# Patient Record
Sex: FEMALE | Race: BLACK OR AFRICAN AMERICAN | NOT HISPANIC OR LATINO | Employment: UNEMPLOYED | URBAN - METROPOLITAN AREA
[De-identification: names, ages, dates, MRNs, and addresses within clinical notes are randomized per-mention and may not be internally consistent; named-entity substitution may affect disease eponyms.]

---
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
## 2023-04-30 ENCOUNTER — HEALTH MAINTENANCE LETTER (OUTPATIENT)
Age: 18
End: 2023-04-30

## 2023-06-05 ENCOUNTER — OFFICE VISIT (OUTPATIENT)
Dept: OPTOMETRY | Facility: CLINIC | Age: 18
End: 2023-06-05
Payer: OTHER GOVERNMENT

## 2023-06-05 DIAGNOSIS — Z01.00 EXAMINATION OF EYES AND VISION: Primary | ICD-10-CM

## 2023-06-05 DIAGNOSIS — H52.13 MYOPIA OF BOTH EYES: ICD-10-CM

## 2023-06-05 DIAGNOSIS — H52.223 REGULAR ASTIGMATISM OF BOTH EYES: ICD-10-CM

## 2023-06-05 PROCEDURE — 92004 COMPRE OPH EXAM NEW PT 1/>: CPT | Performed by: OPTOMETRIST

## 2023-06-05 PROCEDURE — 92015 DETERMINE REFRACTIVE STATE: CPT | Performed by: OPTOMETRIST

## 2023-06-05 ASSESSMENT — REFRACTION_MANIFEST
OD_CYLINDER: +0.25
METHOD_AUTOREFRACTION: 1
OD_AXIS: 168
OD_SPHERE: -1.50
OS_SPHERE: -1.75
OS_CYLINDER: +0.25
OS_AXIS: 178

## 2023-06-05 ASSESSMENT — REFRACTION_WEARINGRX
OD_SPHERE: -1.25
OS_SPHERE: -1.75
OS_AXIS: 178
OD_AXIS: 168
SPECS_TYPE: SVL
OS_CYLINDER: +0.75
OD_CYLINDER: +0.25

## 2023-06-05 ASSESSMENT — CONF VISUAL FIELD
OS_INFERIOR_NASAL_RESTRICTION: 0
OS_SUPERIOR_TEMPORAL_RESTRICTION: 0
OS_INFERIOR_TEMPORAL_RESTRICTION: 0
OS_SUPERIOR_NASAL_RESTRICTION: 0
OD_SUPERIOR_TEMPORAL_RESTRICTION: 0
OD_SUPERIOR_NASAL_RESTRICTION: 0
OS_NORMAL: 1
OD_NORMAL: 1
OD_INFERIOR_TEMPORAL_RESTRICTION: 0
OD_INFERIOR_NASAL_RESTRICTION: 0

## 2023-06-05 ASSESSMENT — KERATOMETRY
OD_K1POWER_DIOPTERS: 42.00
OS_AXISANGLE2_DEGREES: 19
OS_K1POWER_DIOPTERS: 41.75
OS_AXISANGLE_DEGREES: 109
OS_K2POWER_DIOPTERS: 42.25
OD_K2POWER_DIOPTERS: 42.75
OD_AXISANGLE2_DEGREES: 180
OD_AXISANGLE_DEGREES: 90

## 2023-06-05 ASSESSMENT — TONOMETRY
OD_IOP_MMHG: 21
IOP_METHOD: TONOPEN
OS_IOP_MMHG: 19

## 2023-06-05 ASSESSMENT — VISUAL ACUITY
CORRECTION_TYPE: GLASSES
OD_CC: 20/20
METHOD: SNELLEN - LINEAR
OS_SC: 20/50
OS_CC: 20/20
OD_SC+: -2
OD_SC: 20/50
OD_CC+: -2
OS_CC: 20/20
OD_CC: 20/20

## 2023-06-05 ASSESSMENT — CUP TO DISC RATIO
OS_RATIO: 0.35
OD_RATIO: 0.35

## 2023-06-05 ASSESSMENT — EXTERNAL EXAM - LEFT EYE: OS_EXAM: NORMAL

## 2023-06-05 ASSESSMENT — SLIT LAMP EXAM - LIDS
COMMENTS: NORMAL
COMMENTS: NORMAL

## 2023-06-05 ASSESSMENT — EXTERNAL EXAM - RIGHT EYE: OD_EXAM: NORMAL

## 2023-06-05 NOTE — LETTER
6/5/2023         RE: Sherin Ogden  [address] [facility] [address]        Dear Colleague,    Thank you for referring your patient, Sherin Ogden, to the [facility]. Please see a copy of my visit note below.    Chief Complaint   Patient presents with     Annual Eye Exam         Last Eye Exam: 4 years ago  Dilated Previously: Unsure, side effects of dilation explained today    What are you currently using to see?  glasses    Distance Vision Acuity: Noticed gradual change in both eyes    Near Vision Acuity: Satisfied with vision while reading and using computer with glasses and unaided    Eye Comfort: good  Do you use eye drops? 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       OBJECTIVE: See Ophthalmology exam    ASSESSMENT:    ICD-10-CM    1. Examination of eyes and vision  Z01.00       2. Myopia of both eyes  H52.13       3. Regular astigmatism of both eyes  H52.223           PLAN:     Patient Instructions   Eyeglass prescription given.    Return in 1 year for a complete eye exam or sooner if needed.    Marquez Hernandez, PATRIZIA           Again, thank you for allowing me to participate in the care of your patient.        Sincerely,        Marquez Hernandez, OD

## 2023-06-05 NOTE — PATIENT INSTRUCTIONS
Eyeglass prescription given.    Return in 1 year for a complete eye exam or sooner if needed.    Marquez Hernandez, PATRIZIA    The affects of the dilating drops last for 4- 6 hours.  You will be more sensitive to light and vision will be blurry up close.  Do not drive if you do not feel comfortable.  Mydriatic sunglasses were given if needed.      Optometry Providers       Clinic Locations                                 Telephone Number   Dr. Mabel Jacobson [facility]    [facility]  [place] [phone]     [facility] Hours:                [place] [facility] Hours:       [place] [facility] Hours:   [address]   [address]      [address] [place] [address]   [place] [address]    [place] [address]  Phone: [phone]                    Phone: [phone]     Phone: [phone]                      Monday 8:00-6:00                          Monday 8:00-6:00                          Monday 8:00-6:00              Tuesday 8:00-6:00                          Tuesday 8:00-6:00                          Tuesday 8:00-6:00              Wednesday 8:00-6:00                  Wednesday 8:00-6:00                   Wednesday 8:00-6:00      Thursday 8:00-6:00                        Thursday 8:00-6:00                         Thursday 8:00-6:00            Friday 8:00-5:00                              Friday 8:00-5:00                              Friday 8:00-5:00    [facility] Hours:   [address] [place], [place] [id number] [phone]    Monday 9:00-6:00  Tuesday 9:00-6:00  Wednesday 9:00-6:00  Thursday 9:00-6:00  Friday 9:00-5:00  As always, Thank you for trusting us with your health care needs!

## 2023-06-05 NOTE — PROGRESS NOTES
Chief Complaint   Patient presents with     Annual Eye Exam         Last Eye Exam: 4 years ago  Dilated Previously: Unsure, side effects of dilation explained today    What are you currently using to see?  glasses    Distance Vision Acuity: Noticed gradual change in both eyes    Near Vision Acuity: Satisfied with vision while reading and using computer with glasses and unaided    Eye Comfort: good  Do you use eye drops? 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       OBJECTIVE: See Ophthalmology exam    ASSESSMENT:    ICD-10-CM    1. Examination of eyes and vision  Z01.00       2. Myopia of both eyes  H52.13       3. Regular astigmatism of both eyes  H52.223           PLAN:     Patient Instructions   Eyeglass prescription given.    Return in 1 year for a complete eye exam or sooner if needed.    Marquez Hernandez, OD

## 2023-06-07 ENCOUNTER — TELEPHONE (OUTPATIENT)
Dept: FAMILY MEDICINE | Facility: CLINIC | Age: 18
End: 2023-06-07
Payer: OTHER GOVERNMENT

## 2023-06-07 NOTE — TELEPHONE ENCOUNTER
Patient Quality Outreach    Patient is due for the following:   Physical Preventive Adult Physical  Chlamydia Screening      Topic Date Due     HPV Vaccine (2 - 2-dose series) 09/16/2017     COVID-19 Vaccine (3 - Pfizer series) 09/02/2021       Next Steps:   Patient has upcoming appointment, these items will be addressed at that time.    Type of outreach:    Chart review performed, no outreach needed.      Questions for provider review:    None           Iris Mathis MA

## 2023-06-27 ENCOUNTER — NURSE TRIAGE (OUTPATIENT)
Dept: NURSING | Facility: CLINIC | Age: 18
End: 2023-06-27
Payer: OTHER GOVERNMENT

## 2023-06-27 NOTE — TELEPHONE ENCOUNTER
Patient states she is having a reaction that started last night.  It happened after having some ice cream she believes.    Last night it started in eyes and nose itching and swelling, today is spread to cheeks.  Took benadryl at 9:30am.  Patient took a nap and woke up and swelling was slightly worse and tongue felt heavy.  Patient took another benadryl and now her tongue feels normal.    Patients face is red, itchy, swollen, skin is textured.  It is only her face and a little down the neck.    Patient has no breathing or swallowing issues now and never did, never had a reaction like this before, no fever, no severe swelling, not painful to touch, no leg swelling.    Care advise: be seen in 2-3 days.  Reassurance, dont eat the ice cream, cold pack, allergy medication.  Call back if any breathing or swallowing issues.    Lavonne Vanegas RN   06/27/23 6:31 PM  [facility] Nurse Advisor    Reason for Disposition    [1] Mild face swelling from food reaction AND [2] diagnosis never confirmed by a doctor (or NP/PA)    Additional Information    Negative: [1] Life-threatening reaction (anaphylaxis) in the past to similar substance (e.g., food, insect bite/sting, chemical, etc.) AND [2] < 2 hours since exposure    Negative: Unresponsive, passed out or very weak    Negative: Swollen tongue    Negative: Difficulty breathing or wheezing    Negative: Sounds like a life-threatening emergency to the triager    Negative: Followed a face injury    Negative: [1] Bee sting AND [2] within last 24 hours    Negative: Insect bite suspected    Negative: Swelling mainly of lip(s)    Negative: Swelling mainly around the eyes    Negative: [1] SEVERE swelling of entire face AND [2] < 2 hours since exposure to high-risk allergen (e.g., peanuts, tree nuts, fish, shellfish or 1st dose of drug) AND [3] no serious symptoms AND [4] no serious allergic reaction in the past    Negative: Fever    Negative: Taking an ACE Inhibitor medication  "(e.g., benazepril/LOTENSIN, captopril/CAPOTEN, enalapril/VASOTEC, lisinopril/ZESTRIL)    Negative: Patient sounds very sick or weak to the triager    Negative: Pregnant 20 or more weeks    Negative: Postpartum (from 0 to 6 weeks after delivery)    Negative: SEVERE swelling of the entire face    Negative: [1] Swelling is red AND [2] very painful to touch    Negative: Face swelling began after taking a drug    Negative: [1] Looks infected AND [2] large red area (> 2 in. or 5 cm)    Negative: [1] Painful rash AND [2] multiple small blisters grouped together (i.e., dermatomal distribution or \"band\" or \"stripe\")    Negative: Toothache    Negative: Swelling of both lower legs (i.e., bilateral pedal edema)    Negative: Widespread rash on body    Negative: Face swelling is painful to touch    Negative: Looks like a boil or infected sore    Negative: [1] Mild face swelling (puffiness) AND [2] persists > 3 days    Protocols used: FACE SWELLING-A-AH      "

## 2023-06-28 ENCOUNTER — VIRTUAL VISIT (OUTPATIENT)
Dept: FAMILY MEDICINE | Facility: CLINIC | Age: 18
End: 2023-06-28
Payer: OTHER GOVERNMENT

## 2023-06-28 DIAGNOSIS — T78.3XXA ANGIOEDEMA, INITIAL ENCOUNTER: ICD-10-CM

## 2023-06-28 DIAGNOSIS — T78.40XA ALLERGIC REACTION, INITIAL ENCOUNTER: Primary | ICD-10-CM

## 2023-06-28 PROCEDURE — 99213 OFFICE O/P EST LOW 20 MIN: CPT | Mod: 95 | Performed by: PHYSICIAN ASSISTANT

## 2023-06-28 RX ORDER — PREDNISONE 5 MG/1
TABLET ORAL
Qty: 42 TABLET | Refills: 0 | Status: SHIPPED | OUTPATIENT
Start: 2023-06-28

## 2023-06-28 RX ORDER — CETIRIZINE HYDROCHLORIDE 10 MG/1
10 TABLET ORAL DAILY
Qty: 30 TABLET | Refills: 1 | Status: SHIPPED | OUTPATIENT
Start: 2023-06-28

## 2023-06-28 NOTE — PATIENT INSTRUCTIONS
allergic reaction possibly to chocolate in the ice cream vs raw eggs in the cookie dough  Avoid chocolate and eggs until get allergy testing  Avoid chocolate until get allergy testing   prednsione  Take 3 tabs three times a day for 2 days,   then 2 tabs three times a day for 2 days,   then 1 tab three times a day for 2 days  Then 1 tab twice a day for 2 days,   then 1 tab daily for 2 days.  Total 42 tablets for 10 days     Zyrtec 10 mg daily for 1 month   Benadryl 25 mg at bedtime for 1 month   Make appointment with allergy specialist to be tested for food allergies

## 2023-06-28 NOTE — PROGRESS NOTES
Chilango is a 17 year old who is being evaluated via a billable video visit.      How would you like to obtain your AVS? MyChart  If the video visit is dropped, the invitation should be resent by: Send to e-mail at: [email or web address]  Will anyone else be joining your video visit? No    Assessment & Plan   Problem List Items Addressed This Visit    None  Visit Diagnoses     Allergic reaction, initial encounter    -  Primary    Relevant Medications    predniSONE (DELTASONE) 5 MG tablet    cetirizine (ZYRTEC) 10 MG tablet    Other Relevant Orders    Adult Allergy/Asthma Referral    Angioedema, initial encounter        Relevant Medications    cetirizine (ZYRTEC) 10 MG tablet      allergic reaction possibly to chocolate in the ice cream vs raw eggs in the cookie dough  Avoid chocolate and eggs until get allergy testing   prednsione  Take 3 tabs three times a day for 2 days,   then 2 tabs three times a day for 2 days,   then 1 tab three times a day for 2 days  Then 1 tab twice a day for 2 days,   then 1 tab daily for 2 days.  Total 42 tablets for 10 days     Zyrtec 10 mg daily for 1 month   Benadryl 25 mg at bedtime for 1 month   Make appointment with allergy specialist to be tested for food allergies       12 minutes spent by me on the date of the encounter doing chart review, history and exam, documentation and further activities per the note           Ariane Bill PA-C  [facility]    Subjective   Chilango is a 17 year old, presenting for the following health issues:  Allergic Reaction    HPI     Concern - Possible Allergic Reaction  Onset: Yesterday  Description: patient ate chocolate chip cookie dough ice cream and in a a few hours developed an allergic reaction. Swelling under the eyes and rash on face, around eyes, forehead and nose. Patient denies tongue swelling or itchy throat  Intensity: moderate  Progression of Symptoms:  same  Accompanying Signs & Symptoms: Itchiness, redness  and some pain around rash sites  Previous history of similar problem: No. Patient reports that she gets itchy throat when she smells or inhales yam powder, no other reactions before  Precipitating factors:        Worsened by: None  Alleviating factors:        Improved by: None   Therapies tried and outcome: Benadryl only helps with itchiness        Review of Systems   Constitutional, HEENT, cardiovascular, pulmonary, gi and gu systems are negative, except as otherwise noted.      Objective       Vitals:  No vitals were obtained today due to virtual visit.    Physical Exam     GENERAL: Healthy, alert and no distress  EYES: Eyes grossly normal to inspection.  No discharge or erythema, or obvious scleral/conjunctival abnormalities.mild swelling under both lower eyelids  RESP: No audible wheeze, cough, or visible cyanosis.  No visible retractions or increased work of breathing.    SKIN: Visible skin clear. No significant rash, abnormal pigmentation or lesions. Hard to assess over the video camera  NEURO: Cranial nerves grossly intact.  Mentation and speech appropriate for age.  PSYCH: Mentation appears normal, affect normal/bright, judgement and insight intact, normal speech and appearance well-groomed.          Video-Visit Details    Type of service:  Video Visit     Originating Location (pt. Location): Home  Distant Location (provider location):  Off-site  Platform used for Video Visit: FeZo

## 2024-05-04 ENCOUNTER — HEALTH MAINTENANCE LETTER (OUTPATIENT)
Age: 19
End: 2024-05-04

## 2025-05-17 ENCOUNTER — HEALTH MAINTENANCE LETTER (OUTPATIENT)
Age: 20
End: 2025-05-17